# Patient Record
Sex: FEMALE
[De-identification: names, ages, dates, MRNs, and addresses within clinical notes are randomized per-mention and may not be internally consistent; named-entity substitution may affect disease eponyms.]

---

## 2019-04-16 ENCOUNTER — TRANSCRIPTION ENCOUNTER (OUTPATIENT)
Age: 59
End: 2019-04-16

## 2019-09-21 ENCOUNTER — TRANSCRIPTION ENCOUNTER (OUTPATIENT)
Age: 59
End: 2019-09-21

## 2019-10-09 ENCOUNTER — APPOINTMENT (OUTPATIENT)
Dept: INTERNAL MEDICINE | Facility: CLINIC | Age: 59
End: 2019-10-09
Payer: COMMERCIAL

## 2019-10-09 DIAGNOSIS — L03.119 CELLULITIS OF UNSPECIFIED PART OF LIMB: ICD-10-CM

## 2019-10-09 PROBLEM — Z00.00 ENCOUNTER FOR PREVENTIVE HEALTH EXAMINATION: Status: ACTIVE | Noted: 2019-10-09

## 2019-10-09 PROCEDURE — 99204 OFFICE O/P NEW MOD 45 MIN: CPT

## 2019-10-09 NOTE — HISTORY OF PRESENT ILLNESS
[FreeTextEntry1] : 58y/o woman was referred from her primary care physician office for possible cellulitis in left foot. \par On 9/14/19 she felt swelling and pain in dorsal part of her left forefoot along with pain and erythema, was seen in an urgent care and had doxycycline one week and later her PCP increased it to 2 weeks and about one week ago switched to Bactrim and referred her to ID. \par SHe feels worse at night time and usually gets better in morning time. 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 \par SHe didn’t have trauma or injury to her foot. NO DM. \par

## 2019-10-09 NOTE — PHYSICAL EXAM
[General Appearance - In No Acute Distress] : in no acute distress [Sclera] : the sclera and conjunctiva were normal [General Appearance - Alert] : alert [PERRL With Normal Accommodation] : pupils were equal in size, round, reactive to light [Extraocular Movements] : extraocular movements were intact [Outer Ear] : the ears and nose were normal in appearance [Neck Appearance] : the appearance of the neck was normal [Oropharynx] : the oropharynx was normal with no thrush [Thyroid Diffuse Enlargement] : the thyroid was not enlarged [Neck Cervical Mass (___cm)] : no neck mass was observed [Jugular Venous Distention Increased] : there was no jugular-venous distention [Heart Sounds] : normal S1 and S2 [Heart Rate And Rhythm] : heart rate was normal and rhythm regular [Auscultation Breath Sounds / Voice Sounds] : lungs were clear to auscultation bilaterally [Murmurs] : no murmurs [Heart Sounds Gallop] : no gallops [Heart Sounds Pericardial Friction Rub] : no pericardial rub [Bowel Sounds] : normal bowel sounds [Edema] : there was no peripheral edema [Full Pulse] : the pedal pulses are present [Abdomen Tenderness] : non-tender [Abdomen Mass (___ Cm)] : no abdominal mass palpated [Abdomen Soft] : soft [No Palpable Adenopathy] : no palpable adenopathy [Costovertebral Angle Tenderness] : no CVA tenderness [] : no rash [FreeTextEntry1] : Left lower leg with minimal rash looks like tinea [Deep Tendon Reflexes (DTR)] : deep tendon reflexes were 2+ and symmetric [No Focal Deficits] : no focal deficits [Sensation] : the sensory exam was normal to light touch and pinprick [Oriented To Time, Place, And Person] : oriented to person, place, and time [Affect] : the affect was normal

## 2019-10-09 NOTE — ASSESSMENT
[FreeTextEntry1] : Possibly she had cellulitis that has resolved now, discomfort at night time is related to edema, advised to raise her leg as much as she can. No need for any antibiotics at this time. \par Will try clotrimazole to lower part of left leg with few small ring worm type rash, bid for 7days. \par Going for MRI on 10/11. will send me the result as well. \par It also could be arteaga neuroma. \par Return PRN [Risk Reduction] : risk reduction [Anticipatory Guidance] : anticipatory guidance

## 2023-02-21 ENCOUNTER — OFFICE (OUTPATIENT)
Dept: URBAN - METROPOLITAN AREA CLINIC 109 | Facility: CLINIC | Age: 63
Setting detail: OPHTHALMOLOGY
End: 2023-02-21
Payer: COMMERCIAL

## 2023-02-21 DIAGNOSIS — H40.033: ICD-10-CM

## 2023-02-21 DIAGNOSIS — H16.222: ICD-10-CM

## 2023-02-21 DIAGNOSIS — H25.13: ICD-10-CM

## 2023-02-21 DIAGNOSIS — H52.7: ICD-10-CM

## 2023-02-21 DIAGNOSIS — D31.31: ICD-10-CM

## 2023-02-21 PROCEDURE — 92014 COMPRE OPH EXAM EST PT 1/>: CPT | Performed by: OPHTHALMOLOGY

## 2023-02-21 PROCEDURE — 92020 GONIOSCOPY: CPT | Performed by: OPHTHALMOLOGY

## 2023-02-21 ASSESSMENT — REFRACTION_CURRENTRX
OS_OVR_VA: 20/
OD_SPHERE: +5.75
OD_OVR_VA: 20/
OS_SPHERE: +5.25

## 2023-02-21 ASSESSMENT — VISUAL ACUITY
OD_BCVA: 20/20
OS_BCVA: 20/20-1

## 2023-02-21 ASSESSMENT — SUPERFICIAL PUNCTATE KERATITIS (SPK)
OS_SPK: T
OD_SPK: ABSENT

## 2023-02-21 ASSESSMENT — REFRACTION_MANIFEST
OS_VA1: 20/20
OD_VA1: 20/20
OS_SPHERE: +4.75
OD_SPHERE: +6.00

## 2023-02-21 ASSESSMENT — TONOMETRY
OS_IOP_MMHG: 16
OD_IOP_MMHG: 16

## 2023-02-21 ASSESSMENT — REFRACTION_AUTOREFRACTION
OS_SPHERE: UNABLE
OD_SPHERE: +6.50

## 2023-02-21 ASSESSMENT — CONFRONTATIONAL VISUAL FIELD TEST (CVF)
OD_FINDINGS: FULL
OS_FINDINGS: FULL

## 2023-03-23 ENCOUNTER — OFFICE (OUTPATIENT)
Dept: URBAN - METROPOLITAN AREA CLINIC 109 | Facility: CLINIC | Age: 63
Setting detail: OPHTHALMOLOGY
End: 2023-03-23
Payer: COMMERCIAL

## 2023-03-23 DIAGNOSIS — D31.31: ICD-10-CM

## 2023-03-23 DIAGNOSIS — H25.13: ICD-10-CM

## 2023-03-23 DIAGNOSIS — H16.222: ICD-10-CM

## 2023-03-23 DIAGNOSIS — H40.033: ICD-10-CM

## 2023-03-23 DIAGNOSIS — H52.7: ICD-10-CM

## 2023-03-23 PROCEDURE — 92012 INTRM OPH EXAM EST PATIENT: CPT | Performed by: OPHTHALMOLOGY

## 2023-03-23 ASSESSMENT — VISUAL ACUITY
OD_BCVA: 20/40
OS_BCVA: 20/25-1

## 2023-03-23 ASSESSMENT — REFRACTION_MANIFEST
OS_VA1: 20/20
OD_SPHERE: +6.00
OD_VA1: 20/20
OS_SPHERE: +4.75

## 2023-03-23 ASSESSMENT — REFRACTION_CURRENTRX
OD_SPHERE: +5.75
OD_OVR_VA: 20/
OS_OVR_VA: 20/
OS_SPHERE: +5.25

## 2023-03-23 ASSESSMENT — TONOMETRY
OD_IOP_MMHG: 15
OS_IOP_MMHG: 15

## 2023-03-23 ASSESSMENT — SUPERFICIAL PUNCTATE KERATITIS (SPK)
OS_SPK: ABSENT
OD_SPK: ABSENT

## 2023-03-23 ASSESSMENT — CONFRONTATIONAL VISUAL FIELD TEST (CVF)
OS_FINDINGS: FULL
OD_FINDINGS: FULL

## 2023-03-23 ASSESSMENT — REFRACTION_AUTOREFRACTION
OS_SPHERE: UNABLE
OD_SPHERE: +6.50